# Patient Record
Sex: FEMALE | Race: WHITE | NOT HISPANIC OR LATINO | URBAN - METROPOLITAN AREA
[De-identification: names, ages, dates, MRNs, and addresses within clinical notes are randomized per-mention and may not be internally consistent; named-entity substitution may affect disease eponyms.]

---

## 2017-07-17 ENCOUNTER — FOLLOW UP ESTABLISHED (OUTPATIENT)
Dept: URBAN - METROPOLITAN AREA CLINIC 51 | Facility: CLINIC | Age: 77
End: 2017-07-17
Payer: MEDICARE

## 2017-07-17 DIAGNOSIS — H52.4 PRESBYOPIA: ICD-10-CM

## 2017-07-17 DIAGNOSIS — D31.32 BENIGN NEOPLASM OF LEFT CHOROID: ICD-10-CM

## 2017-07-17 DIAGNOSIS — H04.123 DRY EYE SYNDROME OF BILATERAL LACRIMAL GLANDS: Primary | ICD-10-CM

## 2017-07-17 PROCEDURE — 92014 COMPRE OPH EXAM EST PT 1/>: CPT | Performed by: OPTOMETRIST

## 2017-07-17 RX ORDER — NEOMYCIN SULFATE, POLYMYXIN B SULFATE AND DEXAMETHASONE 3.5; 10000; 1 MG/G; [USP'U]/G; MG/G
OINTMENT OPHTHALMIC
Qty: 1 | Refills: 1 | Status: INACTIVE
Start: 2017-07-17 | End: 2018-04-13

## 2017-07-17 ASSESSMENT — VISUAL ACUITY
OD: 20/25
OS: 20/20

## 2017-07-17 ASSESSMENT — INTRAOCULAR PRESSURE
OD: 12
OS: 14

## 2017-07-17 ASSESSMENT — KERATOMETRY
OD: 44.83
OS: 45.07

## 2018-04-13 ENCOUNTER — FOLLOW UP ESTABLISHED (OUTPATIENT)
Dept: URBAN - METROPOLITAN AREA CLINIC 51 | Facility: CLINIC | Age: 78
End: 2018-04-13
Payer: MEDICARE

## 2018-04-13 DIAGNOSIS — Z96.1 PRESENCE OF INTRAOCULAR LENS: ICD-10-CM

## 2018-04-13 DIAGNOSIS — H02.422 MYOGENIC PTOSIS OF LEFT EYELID: ICD-10-CM

## 2018-04-13 PROCEDURE — 92250 FUNDUS PHOTOGRAPHY W/I&R: CPT | Performed by: OPHTHALMOLOGY

## 2018-04-13 PROCEDURE — 92014 COMPRE OPH EXAM EST PT 1/>: CPT | Performed by: OPHTHALMOLOGY

## 2018-04-13 ASSESSMENT — INTRAOCULAR PRESSURE
OS: 17
OD: 17

## 2018-04-13 ASSESSMENT — VISUAL ACUITY
OD: 20/25
OS: 20/20

## 2018-12-12 ENCOUNTER — FOLLOW UP ESTABLISHED (OUTPATIENT)
Dept: URBAN - METROPOLITAN AREA CLINIC 51 | Facility: CLINIC | Age: 78
End: 2018-12-12
Payer: MEDICARE

## 2018-12-12 DIAGNOSIS — H43.393 OTHER VITREOUS OPACITIES, BILATERAL: Primary | ICD-10-CM

## 2018-12-12 PROCEDURE — 92014 COMPRE OPH EXAM EST PT 1/>: CPT | Performed by: OPTOMETRIST

## 2018-12-12 PROCEDURE — 92015 DETERMINE REFRACTIVE STATE: CPT | Performed by: OPTOMETRIST

## 2018-12-12 ASSESSMENT — VISUAL ACUITY
OD: 20/25
OS: 20/25

## 2018-12-12 ASSESSMENT — KERATOMETRY
OS: 44.78
OD: 44.80

## 2018-12-12 ASSESSMENT — INTRAOCULAR PRESSURE
OD: 14
OS: 16

## 2019-10-02 ENCOUNTER — OFFICE VISIT (OUTPATIENT)
Dept: URBAN - METROPOLITAN AREA CLINIC 45 | Facility: CLINIC | Age: 79
End: 2019-10-02
Payer: MEDICARE

## 2019-10-02 PROCEDURE — 92002 INTRM OPH EXAM NEW PATIENT: CPT | Performed by: OPTOMETRIST

## 2019-10-02 RX ORDER — OLOPATADINE HYDROCHLORIDE 2 MG/ML
0.2 % SOLUTION/ DROPS OPHTHALMIC
Qty: 1 | Refills: 6 | Status: INACTIVE
Start: 2019-10-02 | End: 2019-10-17

## 2019-10-02 ASSESSMENT — INTRAOCULAR PRESSURE
OD: 15
OS: 14

## 2019-10-02 NOTE — IMPRESSION/PLAN
Impression: Acute atopic conjunctivitis, bilateral: H10.13. Plan: explained to pt that she has some SWELLING in the eyes, Pt states that she does not believe that it is allergies, but pt will try the antihistamine gtt. PT STATES EYES WERE DAMAGED FROM BRIGHT LIGHTS FROM PREVIOUS EYE EXAM. ENCOURAGED PT TO GET A SECOND OPINION. pt to start Olopatidine 1 gtt qam ou. Educated patient on risks of non-compliance, side effects, benefits and anticipated results, pt understands.

## 2019-10-17 ENCOUNTER — OFFICE VISIT (OUTPATIENT)
Dept: URBAN - METROPOLITAN AREA CLINIC 45 | Facility: CLINIC | Age: 79
End: 2019-10-17
Payer: MEDICARE

## 2019-10-17 DIAGNOSIS — H10.13 ACUTE ATOPIC CONJUNCTIVITIS, BILATERAL: Primary | ICD-10-CM

## 2019-10-17 DIAGNOSIS — H04.209 EPIPHORA: ICD-10-CM

## 2019-10-17 PROCEDURE — 99213 OFFICE O/P EST LOW 20 MIN: CPT | Performed by: OPTOMETRIST

## 2019-10-17 RX ORDER — KETOTIFEN FUMARATE 0.25 MG/ML
SOLUTION OPHTHALMIC
Qty: 1 | Refills: 4 | Status: ACTIVE
Start: 2019-10-17

## 2019-10-17 ASSESSMENT — INTRAOCULAR PRESSURE
OD: 17
OS: 14

## 2019-10-17 NOTE — IMPRESSION/PLAN
Impression: Epiphora: H04.209. Plan: Pt to continue with olopatadine if symptoms worsen or do not improve pt to return to see Dr Christen Chamorro for consult.

## 2019-10-17 NOTE — IMPRESSION/PLAN
Impression: Acute atopic conjunctivitis, bilateral: H10.13.  Plan: pt to continue with olopatadine qam ou

## 2020-01-17 ENCOUNTER — FOLLOW UP ESTABLISHED (OUTPATIENT)
Dept: URBAN - METROPOLITAN AREA CLINIC 51 | Facility: CLINIC | Age: 80
End: 2020-01-17
Payer: MEDICARE

## 2020-01-17 PROCEDURE — 92012 INTRM OPH EXAM EST PATIENT: CPT | Performed by: OPTOMETRIST

## 2020-01-17 RX ORDER — HYPOCHLOROUS ACID/SODIUM CHLOR 0.01 %
0.01 % SPRAY, NON-AEROSOL (ML) TOPICAL
Qty: 2 | Refills: 6 | Status: ACTIVE
Start: 2020-01-17

## 2020-01-30 ENCOUNTER — OFFICE VISIT (OUTPATIENT)
Dept: URBAN - METROPOLITAN AREA CLINIC 11 | Facility: CLINIC | Age: 80
End: 2020-01-30
Payer: MEDICARE

## 2020-01-30 DIAGNOSIS — H16.223 KERATOCONJUNCTIVITIS SICCA, NOT SPECIFIED AS SJÖGREN'S, BILATERAL: Primary | ICD-10-CM

## 2020-01-30 PROCEDURE — 92012 INTRM OPH EXAM EST PATIENT: CPT | Performed by: OPTOMETRIST

## 2020-03-05 ENCOUNTER — FOLLOW UP ESTABLISHED (OUTPATIENT)
Dept: URBAN - METROPOLITAN AREA CLINIC 44 | Facility: CLINIC | Age: 80
End: 2020-03-05
Payer: MEDICARE

## 2020-03-05 PROCEDURE — 92012 INTRM OPH EXAM EST PATIENT: CPT | Performed by: OPTOMETRIST

## 2020-03-05 ASSESSMENT — INTRAOCULAR PRESSURE
OS: 21
OD: 15

## 2020-03-06 ENCOUNTER — FOLLOW UP ESTABLISHED (OUTPATIENT)
Dept: URBAN - METROPOLITAN AREA CLINIC 51 | Facility: CLINIC | Age: 80
End: 2020-03-06
Payer: MEDICARE

## 2020-03-06 DIAGNOSIS — H16.213 EXPOSURE KERATOCONJUNCTIVITIS, BILATERAL: Primary | ICD-10-CM

## 2020-03-06 PROCEDURE — 83516 IMMUNOASSAY NONANTIBODY: CPT | Performed by: OPTOMETRIST

## 2020-03-06 PROCEDURE — 0330T TEAR FILM IMG UNI/BI W/I&R: CPT | Performed by: OPTOMETRIST

## 2020-03-06 PROCEDURE — 83861 MICROFLUID ANALY TEARS: CPT | Performed by: OPTOMETRIST

## 2020-03-06 PROCEDURE — BRUDE BRUDER EYE MOIST COMPRESS: CUSTOM | Performed by: OPTOMETRIST

## 2020-03-06 RX ORDER — NEOMYCIN SULFATE, POLYMYXIN B SULFATE AND DEXAMETHASONE 3.5; 10000; 1 MG/G; [USP'U]/G; MG/G
OINTMENT OPHTHALMIC
Qty: 1 | Refills: 0 | Status: ACTIVE
Start: 2020-03-06